# Patient Record
Sex: MALE | URBAN - METROPOLITAN AREA
[De-identification: names, ages, dates, MRNs, and addresses within clinical notes are randomized per-mention and may not be internally consistent; named-entity substitution may affect disease eponyms.]

---

## 2019-07-09 ENCOUNTER — EMERGENCY (EMERGENCY)
Facility: HOSPITAL | Age: 59
LOS: 1 days | Discharge: HOME | End: 2019-07-09
Attending: EMERGENCY MEDICINE
Payer: MEDICAID

## 2019-07-09 VITALS
TEMPERATURE: 99 F | WEIGHT: 184.97 LBS | HEIGHT: 67 IN | HEART RATE: 57 BPM | DIASTOLIC BLOOD PRESSURE: 72 MMHG | RESPIRATION RATE: 20 BRPM | SYSTOLIC BLOOD PRESSURE: 116 MMHG | OXYGEN SATURATION: 99 %

## 2019-07-09 PROCEDURE — 99283 EMERGENCY DEPT VISIT LOW MDM: CPT

## 2019-07-09 PROCEDURE — 99282 EMERGENCY DEPT VISIT SF MDM: CPT

## 2019-07-09 NOTE — ED PROVIDER NOTE - PMH
Essential hypertension    MI, old    No pertinent past medical history    Other hyperlipidemia    Primary insomnia

## 2019-07-09 NOTE — ED PROVIDER NOTE - OBJECTIVE STATEMENT
57 yo male with PMHx of Insomnia, MI, Sciatica, HLD, arthritis and HTN presenting to ED with complaints of Insomnia his whole life. Patient staes He was given Ambien but it made him "not have a brain", Then Seroquel, he couldn't function, Lunesta "gave me septic shock that went undiagnosed for 3 months", Mirtazapine "gave me a massive MI", and Xanax "I am not crazy so I shouldn't be taking this medication. Patient also stating metoprolol gave him Diabetes. Patient taking melatonin but doesn't like feeling foggy in the am. Denies CP, SOB, or dizziness.

## 2019-07-09 NOTE — ED PROVIDER NOTE - CLINICAL SUMMARY MEDICAL DECISION MAKING FREE TEXT BOX
Based on exam and history patient needs new primary care, will refer to PMD, and recommend psychologist

## 2019-07-09 NOTE — ED PROVIDER NOTE - ATTENDING CONTRIBUTION TO CARE
Agree with NP H&P,  pt with history of insomnia presents with inability to sleep.  Pt not suicidal or homicidal.  Unremarkable exam.  Will d/c with PMD follow up.
